# Patient Record
Sex: MALE | Race: WHITE
[De-identification: names, ages, dates, MRNs, and addresses within clinical notes are randomized per-mention and may not be internally consistent; named-entity substitution may affect disease eponyms.]

---

## 2019-02-05 ENCOUNTER — HOSPITAL ENCOUNTER (OUTPATIENT)
Dept: HOSPITAL 84 - D.CT | Age: 64
Discharge: HOME | End: 2019-02-05
Attending: ORTHOPAEDIC SURGERY
Payer: MEDICARE

## 2019-02-05 DIAGNOSIS — M13.832: Primary | ICD-10-CM

## 2019-02-18 ENCOUNTER — HOSPITAL ENCOUNTER (OUTPATIENT)
Dept: HOSPITAL 84 - D.HCCARDIO | Age: 64
Discharge: HOME | End: 2019-02-18
Attending: INTERNAL MEDICINE
Payer: MEDICARE

## 2019-02-18 DIAGNOSIS — R94.31: Primary | ICD-10-CM

## 2019-02-26 NOTE — ST
PATIENT:EZRA BRANHAM                          MEDICAL RECORD: [id number]
SEX: M                                                   LOCATION:[facility]         
ORDER #:                                                 ADMISSION DATE: 02/18/19
AGE OF PATIENT: 63            
 
REFERRING PHYSICIAN:                                                             
 
INTERPRETING PHYSICIAN: ENIO LEON MD             

 
 
DATE OF SERVICE:  02/18/2019
 
PROCEDURE: Nuclear stress test.
 
INDICATION:  Chest pain, shortness of breath, abnormal ECG, hypertension.  He
was exercised on standard Lexiscan protocol with 32 mCi of sestamibi injected at
peak stress, 10 mCi were used previously for rest images.
 
FINDINGS:  Gated SPECT reveals a preserved ejection fraction at 53% with good
wall motioning and thickening and brightening throughout all segments.  SPECT
imaging Cardiolite was used as myocardial fusion agent.  There are reversible
changes anteriorly and laterally, this includes the basal, mid apical anterior
segments as well as the mid lateral, and basal lateral segments.  The degree of
reversibility is mild.  The amount of myocardium involved is large.
 
OVERALL IMPRESSION:
1.  This is an abnormal nuclear stress test with large area of reversible
ischemia anteriorly and laterally.
2.  Gated SPECT reveals preserved ejection fraction at 53%.  In this patient
with ongoing symptomatology, the current scan does suggest the presence of
hemodynamically significant coronary artery disease.  We would proceed with
coronary angiography as followup study.
 
TRANSINT:[id number] Voice Confirmation ID: [id number] DOCUMENT ID: [id number]
 
 
                                           
                                           ENIO LEON MD             
 
 
 
Electronically Signed by ENIO LEON on 02/26/19 at 1118
 
 
 
 
 
 
 
CC:                                                             [id number]
DICTATION DATE: 02/19/19 1201     :     02/20/19 0322      DEP CLI 
                                                                      02/18/19
[place] [id number]